# Patient Record
Sex: MALE | Race: WHITE | Employment: FULL TIME | URBAN - METROPOLITAN AREA
[De-identification: names, ages, dates, MRNs, and addresses within clinical notes are randomized per-mention and may not be internally consistent; named-entity substitution may affect disease eponyms.]

---

## 2017-05-06 ENCOUNTER — EMPLOYEE WELLNESS (OUTPATIENT)
Dept: OTHER | Age: 42
End: 2017-05-06

## 2017-05-06 LAB
CHOLESTEROL, TOTAL: 187 MG/DL (ref 0–199)
GLUCOSE BLD-MCNC: 87 MG/DL (ref 74–107)
HDLC SERPL-MCNC: 60 MG/DL
LDL CHOLESTEROL CALCULATED: 112 MG/DL (ref 0–99)
TRIGL SERPL-MCNC: 76 MG/DL (ref 0–149)

## 2018-03-20 VITALS — WEIGHT: 194 LBS

## 2018-04-21 ENCOUNTER — EMPLOYEE WELLNESS (OUTPATIENT)
Dept: INTERNAL MEDICINE CLINIC | Age: 43
End: 2018-04-21

## 2018-04-21 LAB
CHOLESTEROL, TOTAL: 201 MG/DL (ref 0–199)
GLUCOSE BLD-MCNC: 91 MG/DL (ref 74–107)
HDLC SERPL-MCNC: 59 MG/DL
LDL CHOLESTEROL CALCULATED: 126 MG/DL (ref 0–99)
TRIGL SERPL-MCNC: 82 MG/DL (ref 0–149)

## 2018-05-02 VITALS — WEIGHT: 196 LBS

## 2019-02-20 ENCOUNTER — PREP FOR PROCEDURE (OUTPATIENT)
Dept: SURGERY | Age: 44
End: 2019-02-20

## 2019-02-21 RX ORDER — RABEPRAZOLE SODIUM 20 MG/1
TABLET, DELAYED RELEASE ORAL
Refills: 12 | COMMUNITY
Start: 2019-02-04 | End: 2020-12-01

## 2019-02-25 RX ORDER — SODIUM CHLORIDE 9 MG/ML
INJECTION, SOLUTION INTRAVENOUS CONTINUOUS
Status: CANCELLED | OUTPATIENT
Start: 2019-02-25

## 2019-03-01 ENCOUNTER — HOSPITAL ENCOUNTER (OUTPATIENT)
Age: 44
Setting detail: OUTPATIENT SURGERY
Discharge: HOME | End: 2019-03-01
Attending: SURGERY | Admitting: SURGERY
Payer: COMMERCIAL

## 2019-03-01 ENCOUNTER — ANESTHESIA (OUTPATIENT)
Dept: ENDOSCOPY | Age: 44
End: 2019-03-01
Payer: COMMERCIAL

## 2019-03-01 ENCOUNTER — ANESTHESIA EVENT (OUTPATIENT)
Dept: ENDOSCOPY | Age: 44
End: 2019-03-01
Payer: COMMERCIAL

## 2019-03-01 VITALS
HEART RATE: 61 BPM | HEIGHT: 71 IN | DIASTOLIC BLOOD PRESSURE: 93 MMHG | BODY MASS INDEX: 26.6 KG/M2 | RESPIRATION RATE: 16 BRPM | OXYGEN SATURATION: 98 % | TEMPERATURE: 97.5 F | SYSTOLIC BLOOD PRESSURE: 134 MMHG | WEIGHT: 190 LBS

## 2019-03-01 VITALS
SYSTOLIC BLOOD PRESSURE: 135 MMHG | DIASTOLIC BLOOD PRESSURE: 77 MMHG | OXYGEN SATURATION: 97 % | RESPIRATION RATE: 15 BRPM

## 2019-03-01 PROCEDURE — 6360000002 HC RX W HCPCS: Performed by: NURSE ANESTHETIST, CERTIFIED REGISTERED

## 2019-03-01 PROCEDURE — 3609012400 HC EGD TRANSORAL BIOPSY SINGLE/MULTIPLE: Performed by: SURGERY

## 2019-03-01 PROCEDURE — 2580000003 HC RX 258: Performed by: SURGERY

## 2019-03-01 PROCEDURE — 7100000010 HC PHASE II RECOVERY - FIRST 15 MIN: Performed by: SURGERY

## 2019-03-01 PROCEDURE — 88305 TISSUE EXAM BY PATHOLOGIST: CPT

## 2019-03-01 PROCEDURE — 7100000011 HC PHASE II RECOVERY - ADDTL 15 MIN: Performed by: SURGERY

## 2019-03-01 PROCEDURE — 3700000000 HC ANESTHESIA ATTENDED CARE: Performed by: SURGERY

## 2019-03-01 PROCEDURE — 2709999900 HC NON-CHARGEABLE SUPPLY: Performed by: SURGERY

## 2019-03-01 PROCEDURE — 88342 IMHCHEM/IMCYTCHM 1ST ANTB: CPT

## 2019-03-01 RX ORDER — PROPOFOL 10 MG/ML
INJECTION, EMULSION INTRAVENOUS PRN
Status: DISCONTINUED | OUTPATIENT
Start: 2019-03-01 | End: 2019-03-01 | Stop reason: SDUPTHER

## 2019-03-01 RX ORDER — SODIUM CHLORIDE 9 MG/ML
INJECTION, SOLUTION INTRAVENOUS CONTINUOUS
Status: DISCONTINUED | OUTPATIENT
Start: 2019-03-01 | End: 2019-03-01 | Stop reason: HOSPADM

## 2019-03-01 RX ADMIN — PROPOFOL 10 MG: 10 INJECTION, EMULSION INTRAVENOUS at 11:04

## 2019-03-01 RX ADMIN — SODIUM CHLORIDE: 9 INJECTION, SOLUTION INTRAVENOUS at 10:55

## 2019-03-01 RX ADMIN — PROPOFOL 200 MG: 10 INJECTION, EMULSION INTRAVENOUS at 10:56

## 2019-03-01 ASSESSMENT — PAIN - FUNCTIONAL ASSESSMENT: PAIN_FUNCTIONAL_ASSESSMENT: 0-10

## 2019-03-01 ASSESSMENT — PAIN DESCRIPTION - PAIN TYPE
TYPE: SURGICAL PAIN
TYPE: SURGICAL PAIN

## 2019-03-01 ASSESSMENT — PAIN SCALES - GENERAL
PAINLEVEL_OUTOF10: 0
PAINLEVEL_OUTOF10: 0

## 2019-12-28 ENCOUNTER — NURSE TRIAGE (OUTPATIENT)
Dept: OTHER | Facility: CLINIC | Age: 44
End: 2019-12-28

## 2020-08-15 ENCOUNTER — EMPLOYEE WELLNESS (OUTPATIENT)
Dept: OTHER | Age: 45
End: 2020-08-15

## 2020-08-15 LAB
CHOLESTEROL, TOTAL: 186 MG/DL (ref 0–199)
GLUCOSE BLD-MCNC: 96 MG/DL (ref 74–107)
HDLC SERPL-MCNC: 63 MG/DL
LDL CHOLESTEROL CALCULATED: 104 MG/DL (ref 0–99)
TRIGL SERPL-MCNC: 94 MG/DL (ref 0–149)

## 2020-09-14 ENCOUNTER — TELEPHONE (OUTPATIENT)
Dept: PRIMARY CARE CLINIC | Age: 45
End: 2020-09-14

## 2020-09-14 ENCOUNTER — TELEPHONE (OUTPATIENT)
Dept: ADMINISTRATIVE | Age: 45
End: 2020-09-14

## 2020-09-14 NOTE — TELEPHONE ENCOUNTER
Pt's wife called to schedule new appt w/Sandra. Pt is having acid reflux/stomach issues.   She can be reached at [phone]

## 2020-09-14 NOTE — TELEPHONE ENCOUNTER
Patient's wife is going to call the office back to schedule an appointment.     Electronically signed by Mulugeta Hansen on 9/14/20 at 11:16 AM EDT

## 2020-09-15 ENCOUNTER — TELEPHONE (OUTPATIENT)
Dept: SURGERY | Age: 45
End: 2020-09-15

## 2020-09-15 NOTE — TELEPHONE ENCOUNTER
Patient stated he was going to call Dr. Mir Jacob to get an appointment scheduled to be evaluated before his referral to Dr. Bandar Peralta.     Electronically signed by Dorothea Nichole on 9/15/20 at 12:49 PM EDT

## 2020-09-17 ENCOUNTER — HOSPITAL ENCOUNTER (OUTPATIENT)
Age: 45
Discharge: HOME | End: 2020-09-19
Payer: COMMERCIAL

## 2020-09-17 ENCOUNTER — OFFICE VISIT (OUTPATIENT)
Dept: PRIMARY CARE CLINIC | Age: 45
End: 2020-09-17
Payer: COMMERCIAL

## 2020-09-17 VITALS
DIASTOLIC BLOOD PRESSURE: 84 MMHG | BODY MASS INDEX: 27.89 KG/M2 | WEIGHT: 200 LBS | SYSTOLIC BLOOD PRESSURE: 120 MMHG | HEART RATE: 78 BPM | TEMPERATURE: 96.7 F | OXYGEN SATURATION: 97 %

## 2020-09-17 LAB
ALBUMIN SERPL-MCNC: 4.6 G/DL (ref 3.5–5.2)
ALP BLD-CCNC: 61 U/L (ref 40–129)
ALT SERPL-CCNC: 28 U/L (ref 0–40)
AMYLASE: 38 U/L (ref 20–100)
ANION GAP SERPL CALCULATED.3IONS-SCNC: 15 MMOL/L (ref 7–16)
AST SERPL-CCNC: 24 U/L (ref 0–39)
BASOPHILS ABSOLUTE: 0.05 E9/L (ref 0–0.2)
BASOPHILS RELATIVE PERCENT: 1 % (ref 0–2)
BILIRUB SERPL-MCNC: 1.9 MG/DL (ref 0–1.2)
BUN BLDV-MCNC: 20 MG/DL (ref 6–20)
CALCIUM SERPL-MCNC: 10 MG/DL (ref 8.6–10.2)
CHLORIDE BLD-SCNC: 101 MMOL/L (ref 98–107)
CHOLESTEROL, TOTAL: 197 MG/DL (ref 0–199)
CO2: 24 MMOL/L (ref 22–29)
CREAT SERPL-MCNC: 1 MG/DL (ref 0.7–1.2)
EOSINOPHILS ABSOLUTE: 0.4 E9/L (ref 0.05–0.5)
EOSINOPHILS RELATIVE PERCENT: 7.8 % (ref 0–6)
GFR AFRICAN AMERICAN: >60
GFR NON-AFRICAN AMERICAN: >60 ML/MIN/1.73
GLUCOSE BLD-MCNC: 89 MG/DL (ref 74–99)
HCT VFR BLD CALC: 48.1 % (ref 37–54)
HDLC SERPL-MCNC: 56 MG/DL
HEMOGLOBIN: 16.2 G/DL (ref 12.5–16.5)
IMMATURE GRANULOCYTES #: 0 E9/L
IMMATURE GRANULOCYTES %: 0 % (ref 0–5)
LDL CHOLESTEROL CALCULATED: 120 MG/DL (ref 0–99)
LIPASE: 19 U/L (ref 13–60)
LYMPHOCYTES ABSOLUTE: 1.34 E9/L (ref 1.5–4)
LYMPHOCYTES RELATIVE PERCENT: 26 % (ref 20–42)
MCH RBC QN AUTO: 30.7 PG (ref 26–35)
MCHC RBC AUTO-ENTMCNC: 33.7 % (ref 32–34.5)
MCV RBC AUTO: 91.3 FL (ref 80–99.9)
MONOCYTES ABSOLUTE: 0.53 E9/L (ref 0.1–0.95)
MONOCYTES RELATIVE PERCENT: 10.3 % (ref 2–12)
NEUTROPHILS ABSOLUTE: 2.83 E9/L (ref 1.8–7.3)
NEUTROPHILS RELATIVE PERCENT: 54.9 % (ref 43–80)
PDW BLD-RTO: 12.5 FL (ref 11.5–15)
PLATELET # BLD: 199 E9/L (ref 130–450)
PMV BLD AUTO: 10 FL (ref 7–12)
POTASSIUM SERPL-SCNC: 4.4 MMOL/L (ref 3.5–5)
RBC # BLD: 5.27 E12/L (ref 3.8–5.8)
SODIUM BLD-SCNC: 140 MMOL/L (ref 132–146)
TOTAL PROTEIN: 7.3 G/DL (ref 6.4–8.3)
TRIGL SERPL-MCNC: 103 MG/DL (ref 0–149)
TSH SERPL DL<=0.05 MIU/L-ACNC: 1.91 UIU/ML (ref 0.27–4.2)
VLDLC SERPL CALC-MCNC: 21 MG/DL
WBC # BLD: 5.2 E9/L (ref 4.5–11.5)

## 2020-09-17 PROCEDURE — 99214 OFFICE O/P EST MOD 30 MIN: CPT | Performed by: FAMILY MEDICINE

## 2020-09-17 PROCEDURE — 84443 ASSAY THYROID STIM HORMONE: CPT

## 2020-09-17 PROCEDURE — 80053 COMPREHEN METABOLIC PANEL: CPT

## 2020-09-17 PROCEDURE — 83690 ASSAY OF LIPASE: CPT

## 2020-09-17 PROCEDURE — 80061 LIPID PANEL: CPT

## 2020-09-17 PROCEDURE — 85025 COMPLETE CBC W/AUTO DIFF WBC: CPT

## 2020-09-17 PROCEDURE — 82150 ASSAY OF AMYLASE: CPT

## 2020-09-17 PROCEDURE — 36415 COLL VENOUS BLD VENIPUNCTURE: CPT

## 2020-09-17 RX ORDER — FAMOTIDINE 40 MG/1
40 TABLET, FILM COATED ORAL EVERY EVENING
Qty: 30 TABLET | Refills: 3 | Status: SHIPPED | OUTPATIENT
Start: 2020-09-17 | End: 2020-12-01

## 2020-09-17 ASSESSMENT — PATIENT HEALTH QUESTIONNAIRE - PHQ9
SUM OF ALL RESPONSES TO PHQ9 QUESTIONS 1 & 2: 0
SUM OF ALL RESPONSES TO PHQ QUESTIONS 1-9: 0
1. LITTLE INTEREST OR PLEASURE IN DOING THINGS: 0
SUM OF ALL RESPONSES TO PHQ QUESTIONS 1-9: 0
2. FEELING DOWN, DEPRESSED OR HOPELESS: 0

## 2020-09-17 ASSESSMENT — ENCOUNTER SYMPTOMS
EYES NEGATIVE: 1
RESPIRATORY NEGATIVE: 1
ALLERGIC/IMMUNOLOGIC NEGATIVE: 1
DIARRHEA: 1
ABDOMINAL DISTENTION: 1

## 2020-09-17 NOTE — PROGRESS NOTES
20     Ian Elkins    : 1975 Sex: male   Age: 41 y.o. Chief Complaint   Patient presents with    Abdominal Pain     worsening the last few months with cramping. Bloated alot       Prior to Admission medications    Medication Sig Start Date End Date Taking? Authorizing Provider   famotidine (PEPCID) 40 MG tablet Take 1 tablet by mouth every evening 20  Yes Bladimir Zhong,    RABEprazole (ACIPHEX) 20 MG tablet TAKE ONE TABLET BY MOUTH EVERY DAY 19  Yes Historical Provider, MD          HPI: Patient seen this morning issues of diarrhea abdominal bloating reflux disease hyperlipidemia all of which is been present for the past 6 to 9 months by history. He was seen approximately a year ago for reflux disease and placed on AcipHex and this could be a contributing factor. AcipHex is placed on hold and we will try Pepcid 40 mg a day and then reassess here in the next couple weeks. Baseline labs to be completed. Review of Systems   Constitutional: Negative. HENT: Negative. Eyes: Negative. Respiratory: Negative. Gastrointestinal: Positive for abdominal distention and diarrhea. Endocrine: Negative. Genitourinary: Negative. Musculoskeletal: Negative. Skin: Negative. Allergic/Immunologic: Negative. Neurological: Negative. Hematological: Negative. Psychiatric/Behavioral: Negative.                Current Outpatient Medications:     famotidine (PEPCID) 40 MG tablet, Take 1 tablet by mouth every evening, Disp: 30 tablet, Rfl: 3    RABEprazole (ACIPHEX) 20 MG tablet, TAKE ONE TABLET BY MOUTH EVERY DAY, Disp: , Rfl: 12    No Known Allergies    Social History     Tobacco Use    Smoking status: Never Smoker    Smokeless tobacco: Never Used   Substance Use Topics    Alcohol use: Yes     Comment: socially    Drug use: No      Past Surgical History:   Procedure Laterality Date    SHOULDER SURGERY Right 2018    bone spurs    UPPER GASTROINTESTINAL Future  -     Lipase; Future    Abdominal bloating  -     CBC Auto Differential; Future  -     Comprehensive Metabolic Panel; Future  -     Cancel: T4; Future  -     TSH without Reflex; Future  -     Lipid Panel; Future  -     Amylase; Future  -     Lipase; Future    Gastroesophageal reflux disease with esophagitis  -     CBC Auto Differential; Future  -     Comprehensive Metabolic Panel; Future  -     Cancel: T4; Future  -     TSH without Reflex; Future  -     Lipid Panel; Future    Hyperlipidemia, unspecified hyperlipidemia type  -     CBC Auto Differential; Future  -     Comprehensive Metabolic Panel; Future  -     Cancel: T4; Future  -     TSH without Reflex; Future  -     Lipid Panel; Future    Other orders  -     famotidine (PEPCID) 40 MG tablet; Take 1 tablet by mouth every evening            Return in about 2 weeks (around 10/1/2020). Jomar Fowler DO    Note was generated with the assistance of voice recognition software. Document was reviewed however may contain grammatical errors.

## 2020-10-01 ENCOUNTER — OFFICE VISIT (OUTPATIENT)
Dept: PRIMARY CARE CLINIC | Age: 45
End: 2020-10-01
Payer: COMMERCIAL

## 2020-10-01 VITALS
BODY MASS INDEX: 27.34 KG/M2 | OXYGEN SATURATION: 98 % | RESPIRATION RATE: 16 BRPM | WEIGHT: 196 LBS | HEART RATE: 68 BPM | SYSTOLIC BLOOD PRESSURE: 110 MMHG | DIASTOLIC BLOOD PRESSURE: 70 MMHG | TEMPERATURE: 97.7 F

## 2020-10-01 PROBLEM — K58.0 IRRITABLE BOWEL SYNDROME WITH DIARRHEA: Status: ACTIVE | Noted: 2020-10-01

## 2020-10-01 PROBLEM — R10.84 GENERALIZED ABDOMINAL PAIN: Status: ACTIVE | Noted: 2020-10-01

## 2020-10-01 PROBLEM — K21.9 GASTROESOPHAGEAL REFLUX DISEASE: Status: ACTIVE | Noted: 2020-10-01

## 2020-10-01 PROCEDURE — 99214 OFFICE O/P EST MOD 30 MIN: CPT | Performed by: FAMILY MEDICINE

## 2020-10-01 ASSESSMENT — ENCOUNTER SYMPTOMS
RESPIRATORY NEGATIVE: 1
ABDOMINAL PAIN: 1
ALLERGIC/IMMUNOLOGIC NEGATIVE: 1
ABDOMINAL DISTENTION: 1
EYES NEGATIVE: 1
DIARRHEA: 1

## 2020-10-01 NOTE — PROGRESS NOTES
10/1/20     Annalise Parson    : 1975 Sex: male   Age: 41 y.o. Chief Complaint   Patient presents with    Diarrhea    Abdominal Pain    Gastroesophageal Reflux    Hyperlipidemia    Discuss Labs       Prior to Admission medications    Medication Sig Start Date End Date Taking? Authorizing Provider   famotidine (PEPCID) 40 MG tablet Take 1 tablet by mouth every evening 20  Yes Bladimir Zhong,    RABEprazole (ACIPHEX) 20 MG tablet TAKE ONE TABLET BY MOUTH EVERY DAY 19   Historical Provider, MD          HPI: Patient evaluated today with irritable bowel diarrhea intermittent abdominal discomfort bloating gastroesophageal reflux disease no significant change with switching from AcipHex to famotidine but we will maintain the famotidine for the time being. Recommending evaluation for upper and lower endoscopy and will schedule with Dr. Christophe Cedeno. Review of Systems   Constitutional: Negative. HENT: Negative. Eyes: Negative. Respiratory: Negative. Gastrointestinal: Positive for abdominal distention, abdominal pain and diarrhea. Endocrine: Negative. Genitourinary: Negative. Musculoskeletal: Negative. Skin: Negative. Allergic/Immunologic: Negative. Neurological: Negative. Hematological: Negative. Psychiatric/Behavioral: Negative.                Current Outpatient Medications:     famotidine (PEPCID) 40 MG tablet, Take 1 tablet by mouth every evening, Disp: 30 tablet, Rfl: 3    RABEprazole (ACIPHEX) 20 MG tablet, TAKE ONE TABLET BY MOUTH EVERY DAY, Disp: , Rfl: 12    No Known Allergies    Social History     Tobacco Use    Smoking status: Never Smoker    Smokeless tobacco: Never Used   Substance Use Topics    Alcohol use: Yes     Comment: socially    Drug use: No      Past Surgical History:   Procedure Laterality Date    SHOULDER SURGERY Right 2018    bone spurs    UPPER GASTROINTESTINAL ENDOSCOPY N/A 3/1/2019    EGD BIOPSY performed by Sam Courtney Cheryle Brave, MD at [facility] ENDOSCOPY     No family history on file. Past Medical History:   Diagnosis Date    Acid reflux disease        Vitals:    10/01/20 0714   BP: 110/70   Pulse: 68   Resp: 16   Temp: 97.7 °F (36.5 °C)   TempSrc: Temporal   SpO2: 98%   Weight: 196 lb (88.9 kg)     BP Readings from Last 3 Encounters:   10/01/20 110/70   09/17/20 120/84   03/01/19 (!) 134/93        Physical Exam  Vitals signs and nursing note reviewed. Constitutional:       Appearance: He is well-developed. HENT:      Head: Normocephalic. Right Ear: External ear normal.      Left Ear: External ear normal.      Nose: Nose normal.   Eyes:      Conjunctiva/sclera: Conjunctivae normal.      Pupils: Pupils are equal, round, and reactive to light. Neck:      Musculoskeletal: Normal range of motion and neck supple. Cardiovascular:      Rate and Rhythm: Normal rate. Pulmonary:      Breath sounds: Normal breath sounds. Abdominal:      General: Bowel sounds are normal.      Palpations: Abdomen is soft. Musculoskeletal: Normal range of motion. Skin:     General: Skin is warm and dry. Neurological:      Mental Status: He is alert and oriented to person, place, and time. Psychiatric:         Behavior: Behavior normal.     's vitals physical examination stable. Maintain current meds and care. Reassessment with me next month and sooner if problems. Labs reviewed slight bilirubin elevation so we will repeat a CMP prior to follow-up maintain current care surgical referral today. Plan Per Assessment:  Emi Kahn was seen today for diarrhea, abdominal pain, gastroesophageal reflux, hyperlipidemia and discuss labs. Diagnoses and all orders for this visit:    Irritable bowel syndrome with diarrhea    Generalized abdominal pain    Gastroesophageal reflux disease, unspecified whether esophagitis present            No follow-ups on file.       Shona Foster DO    Note was generated with the assistance of voice

## 2020-10-15 ENCOUNTER — OFFICE VISIT (OUTPATIENT)
Dept: SURGERY | Age: 45
End: 2020-10-15
Payer: COMMERCIAL

## 2020-10-15 VITALS
HEART RATE: 73 BPM | DIASTOLIC BLOOD PRESSURE: 80 MMHG | SYSTOLIC BLOOD PRESSURE: 140 MMHG | RESPIRATION RATE: 16 BRPM | WEIGHT: 203.5 LBS | BODY MASS INDEX: 28.49 KG/M2 | OXYGEN SATURATION: 99 % | HEIGHT: 71 IN | TEMPERATURE: 98.5 F

## 2020-10-15 PROCEDURE — 99203 OFFICE O/P NEW LOW 30 MIN: CPT | Performed by: SURGERY

## 2020-10-15 RX ORDER — DICYCLOMINE HCL 20 MG
20 TABLET ORAL 3 TIMES DAILY PRN
Qty: 120 TABLET | Refills: 3 | Status: SHIPPED | OUTPATIENT
Start: 2020-10-15

## 2020-10-15 NOTE — PATIENT INSTRUCTIONS
Patient Education        Learning About the Low FODMAP Diet for Irritable Bowel Syndrome (IBS)  What is the low-FODMAP diet? A low-FODMAP diet is a way to find out what foods give you digestion problems. You stop eating certain high-FODMAP foods for about 2 months. Then you add them back to see how your body reacts. This is called a \"challenge diet. \" A dietitian or doctor can help you follow this diet. FODMAPs are carbohydrates. They are in many types of foods. FODMAP stands for:  · F ermentable. · O ligosaccharides. · D isaccharides. · M onosaccharides. · A nd p olyols. If you have digestive problems, some of these foods can make your symptoms worse. When you are on this diet, you can still eat certain fruits and vegetables. You can also eat certain grains, meats, fish, and lactose-free milks. What is it used for? If you have irritable bowel syndrome (IBS), you can ease your symptoms by not eating some types of foods. Some people also use this diet for inflammatory bowel disease (IBD) or some food intolerances. High-FODMAP foods can be hard to digest. They pull more fluid into your intestines. They are also easily fermented. This can lead to bloating, belly pain, gas, and diarrhea. The low-FODMAP diet can help you figure out what foods to avoid. And it can help you find foods that are easier to digest.  This diet can help with symptoms of some digestive diseases. But it's not a cure. You will still need to manage your condition. How does it work? You will work with a doctor or dietitian when you start the diet. At first, you won't eat any high-FODMAP foods for a few weeks. Go to www.[employer]. Tune[employer]rol to learn more about this diet. Eneida Reyes also find links to an radha for your phone or other device. You'll find low-FODMAP cookbooks there too. After 6 to 8 weeks, you will start to try high-FODMAP foods again. You will add those foods back to your diet, one group at a time.  Your doctor or dietitian will probably have you wait a few days before you add each new group of those foods. Keep a food diary. You can write down the foods you try and note how they make you feel. After a few weeks, you may have a better idea of what foods you should avoid and what foods make you feel your best.  What are the risks? There is some risk of not getting all of the vitamins and nutrients you need on the low-FODMAP diet. These include:  · Folate. · Thiamin. · Vitamin B6.  · Calcium. · Vitamin D. Your dietitian or doctor can help you find other sources of these if needed. This diet may limit your fiber intake. Try to plan your meals to include other sources of fiber. What foods are on the low-FODMAP diet? Here is a guide to foods that you can eat, plus the foods that you should avoid, when you are on the low-FODMAP diet. Grains  Okay to eat: Foods made from grains like arrowroot, buckwheat, corn, millet, and oats. You can also eat potato, quinoa, rice, sorghum, tapioca, and teff. Cereals, pasta, breads, corn tortillas and baked goods made from these grains are also okay. (These grains may be labeled \"gluten-free. \")  Avoid: Grains like wheat, barley, and rye. Avoid ingredients such as bulgur, couscous, durum, and semolina. And avoid cereals, breads, and pastas made from these grains. Avoid chickpea, lentil, and pea flour. Proteins  Okay to eat: Most meat, fish, and eggs without high-FODMAP sauces. You can have small amounts of almonds or hazelnuts (10 nuts). Macadamia nuts, peanuts, pecans, pine nuts, and walnuts are also okay. You can also eat cesar and pumpkin seeds, tofu, and tempeh. Avoid: Beans, chickpeas, lentils, and soybeans. Avoid pistachio and cashew nuts. And some sausages may have high-FODMAP ingredients. Dairy  Okay to eat: Lactose-free dairy milks. Rice milk and almond milk are okay. So are lactose-free yogurts, kefirs, ice creams, and sorbet from low-FODMAP fruits and sweeteners.  (These are often labeled \"lactose-free. \") You can have small amounts (2 Tbsp) of cottage, cream, or ricotta cheese. Hard cheeses like cheddar, Aguila, ROSS, and Swiss are okay. So are small amounts (1 oz) of aged or ripened cheeses like Brie, blue, and feta. Avoid: Milk, including cow, goat, and sheep. Avoid condensed or evaporated milk, buttermilk, custard, cream, sour cream, yogurt, and ice cream. Avoid soy milk. (Check sauces for dairy ingredients.)  Vegetables  Okay to eat: Bamboo shoots, bell peppers, bok beatriz, up to ½ cup of broccoli or cabbage (red or white), and cucumbers. Eggplant, green beans, lettuce, olives, parsnips, and potatoes are okay to eat. So are pumpkin, rutabaga, seaweed, sprouts, Swiss chard, and spinach. You can eat scallions (green part only) and squash (not butternut). You can eat tomatoes, turnips, watercress, yams, and zucchini. You can also have small amounts of artichoke hearts (from can, 1 oz), carrots, corn (½ cob), and sweet potato (½ cup). Avoid: Artichokes, asparagus, [place] sprouts, edward cabbage, cauliflower, and celery. And avoid garlic, leeks, mushrooms, okra, onions, scallions (white part), shallots, and peas. Fruits  Okay to eat: Bananas, blueberries, cantaloupe, coconut, grapes, and honeydew. Kiwi, quinten, limes, oranges, passion fruit, papaya, and pineapple are also okay. You can eat plantain, raspberries, rhubarb, star fruit, strawberries, tangelo, and tangerine. You can also have small amounts of dried banana chips (up to 10 chips), dried cranberries (1 Tbsp), and shredded coconut (up to ¼ cup). Avoid: Apples, applesauce, apricots, avocados, blackberries, boysenberries, and cherries. Also avoid dates, figs, grapefruit, guava, lychee, and mangoes. Don't eat nectarines, peaches, pears, persimmon, plums, prunes, tamarillo, or watermelon. And limit most canned and dried fruits.   Oils, spices, condiments, and sweeteners  Okay to eat: Vegetable oils (including garlic infused), butter, ghee, Version: 12.6  © 2360-9084 [employer]. Care instructions adapted under license by [facility] ([facility]). If you have questions about a medical condition or this instruction, always ask your healthcare professional. [address] any warranty or liability for your use of this information. Patient Education        Irritable Bowel Syndrome: Care Instructions  Your Care Instructions  Irritable bowel syndrome, or IBS, is a problem with the intestines that causes belly pain, bloating, gas, constipation, and diarrhea. The cause of IBS is not well known. IBS can last for many years, but it does not get worse over time or lead to serious disease. Most people can control their symptoms by changing their diet and reducing stress. Follow-up care is a key part of your treatment and safety. Be sure to make and go to all appointments, and call your doctor if you are having problems. It's also a good idea to know your test results and keep a list of the medicines you take. How can you care for yourself at home? · Prevent diarrhea:  ? Limit the amount of high-fiber foods you eat. This includes vegetables, fruits, whole-grain breads and pasta, high-fiber cereal, and brown rice. ? Limit dairy products. ? Limit artificial sweeteners such as sorbitol and xylitol. · Avoid constipation:  ? Include fruits, vegetables, beans, and whole grains in your diet each day. These foods are high in fiber. ? Drink plenty of fluids. If you have kidney, heart, or liver disease and have to limit fluids, talk with your doctor before you increase the amount of fluids you drink. ? Get some exercise every day. Build up slowly to 30 to 60 minutes a day on 5 or more days of the week. ? Take a fiber supplement, such as Citrucel or Metamucil, every day if needed. Read and follow all instructions on the label. ? Schedule time each day for a bowel movement. Having a daily routine may help.  Take your time and do not strain when having a bowel movement. · To help relieve bloating or gas, avoid foods such as beans, cabbage, cauliflower, or broccoli. · Keep a daily diary of what you eat and what symptoms you have. This may help find foods that cause you problems. · Eat slowly. Try to make mealtime relaxing. · Find ways to reduce stress. When should you call for help? Call your doctor now or seek immediate medical care if:    · Your pain is different than usual or occurs with fever.     · You lose weight without trying, or you lose your appetite and you do not know why.     · Your symptoms often wake you from sleep.     · Your stools are black and tarlike or have streaks of blood. Watch closely for changes in your health, and be sure to contact your doctor if:    · Your IBS symptoms get worse or begin to disrupt your day-to-day life.     · You become more tired than usual.     · Your home treatment stops working. Where can you learn more? Go to https://K[employer].Cynny. org and sign in to your [employer] account. Enter X009 in the SurgeryEdu box to learn more about \"Irritable Bowel Syndrome: Care Instructions. \"     If you do not have an account, please click on the \"Sign Up Now\" link. Current as of: April 15, 2020               Content Version: 12.6  © 1156-2174 [employer]. Care instructions adapted under license by [facility] [place] ([facility]). If you have questions about a medical condition or this instruction, always ask your healthcare professional. [place] [address] any warranty or liability for your use of this information. Patient Education        Colonoscopy: What to Expect at [address] [place] [address]  After a colonoscopy, you'll stay at the clinic for 1 to 2 hours until the medicines wear off. Then you can go home. But you'll need to arrange for a ride. Your doctor will tell you when you can eat and do your other usual activities.   Your doctor will talk to you about when you'll need your next colonoscopy. Your doctor can help you decide how often you need to be checked. This will depend on the results of your test and your risk for colorectal cancer. After the test, you may be bloated or have gas pains. You may need to pass gas. If a biopsy was done or a polyp was removed, you may have streaks of blood in your stool (feces) for a few days. Problems such as heavy rectal bleeding may not occur until several weeks after the test. This isn't common. But it can happen after polyps are removed. This care sheet gives you a general idea about how long it will take for you to recover. But each person recovers at a different pace. Follow the steps below to get better as quickly as possible. How can you care for yourself at home? Activity    · Rest when you feel tired.     · You can do your normal activities when it feels okay to do so. Diet    · Follow your doctor's directions for eating.     · Unless your doctor has told you not to, drink plenty of fluids. This helps to replace the fluids that were lost during the colon prep.     · Do not drink alcohol. Medicines    · Your doctor will tell you if and when you can restart your medicines. He or she will also give you instructions about taking any new medicines.     · If you take aspirin or some other blood thinner, ask your doctor if and when to start taking it again. Make sure that you understand exactly what your doctor wants you to do.     · If polyps were removed or a biopsy was done during the test, your doctor may tell you not to take aspirin or other anti-inflammatory medicines for a few days. These include ibuprofen (Advil, Motrin) and naproxen (Aleve). Other instructions    · For your safety, do not drive or operate machinery until the medicine wears off and you can think clearly.  Your doctor may tell you not to drive or operate machinery until the day after your test.     · Do not sign legal documents or make major decisions until the medicine wears off and you can think clearly. The anesthesia can make it hard for you to fully understand what you are agreeing to. Follow-up care is a key part of your treatment and safety. Be sure to make and go to all appointments, and call your doctor if you are having problems. It's also a good idea to know your test results and keep a list of the medicines you take. When should you call for help? Call 911 anytime you think you may need emergency care. For example, call if:    · You passed out (lost consciousness).     · You pass maroon or bloody stools.     · You have trouble breathing. Call your doctor now or seek immediate medical care if:    · You have pain that does not get better after you take pain medicine.     · You are sick to your stomach or cannot drink fluids.     · You have new or worse belly pain.     · You have blood in your stools.     · You have a fever.     · You cannot pass stools or gas. Watch closely for changes in your health, and be sure to contact your doctor if you have any problems. Where can you learn more? Go to https://Fortressware.kidthing. org and sign in to your [employer] account. Enter E264 in the [employer] box to learn more about \"Colonoscopy: What to Expect at Home. \"     If you do not have an account, please click on the \"Sign Up Now\" link. Current as of: April 29, 2020               Content Version: 12.6  © 8321-7912 [employer]. Care instructions adapted under license by [facility] ([facility]). If you have questions about a medical condition or this instruction, always ask your healthcare professional. [place] [address] any warranty or liability for your use of this information. Patient Education        Gastroesophageal Reflux Disease (GERD): Care Instructions  Overview     Gastroesophageal reflux disease (GERD) is the backward flow of stomach acid into the esophagus.  The esophagus is the tube that leads from your throat to your stomach. A one-way valve prevents the stomach acid from backing up into this tube. But when you have GERD, this valve does not close tightly enough. This can also cause pain and swelling in your esophagus. (This is called esophagitis.)  If you have mild GERD symptoms including heartburn, you may be able to control the problem with antacids or over-the-counter medicine. You can also make lifestyle changes to help reduce your symptoms. These include changing your diet and eating habits, such as not eating late at night and losing weight. Follow-up care is a key part of your treatment and safety. Be sure to make and go to all appointments, and call your doctor if you are having problems. It's also a good idea to know your test results and keep a list of the medicines you take. How can you care for yourself at home? · Take your medicines exactly as prescribed. Call your doctor if you think you are having a problem with your medicine. · Your doctor may recommend over-the-counter medicine. For mild or occasional indigestion, antacids, such as Tums, Gaviscon, Mylanta, or Maalox, may help. Your doctor also may recommend over-the-counter acid reducers, such as famotidine (Pepcid AC), cimetidine (Tagamet HB), or omeprazole (Prilosec). Read and follow all instructions on the label. If you use these medicines often, talk with your doctor. · Change your eating habits. ? It's best to eat several small meals instead of two or three large meals. ? After you eat, wait 2 to 3 hours before you lie down. ? Chocolate, mint, and alcohol can make GERD worse. ? Spicy foods, foods that have a lot of acid (like tomatoes and oranges), and coffee can make GERD symptoms worse in some people. If your symptoms are worse after you eat a certain food, you may want to stop eating that food to see if your symptoms get better. · Do not smoke or chew tobacco. Smoking can make GERD worse.  If you need help

## 2020-10-15 NOTE — PROGRESS NOTES
111 [facility] Surgery Clinic Note    Assessment/Plan:      Diagnosis Orders   1. Irritable bowel syndrome with diarrhea  dicyclomine (BENTYL) 20 MG tablet      Bentyl. Continue Pepcid. Diet modification with low FODMAP. Plan for colonoscopy. Has had recent EGD         Return for Colonoscopy. Chief Complaint   Patient presents with    New Patient     ref by Dr. David Garcia for IBS and diarrhea. GERD. has never had a colonoscopy before. has had an EGD before. PCP: Minesh Forrest DO    HPI: Almarie Bence is a 41 y.o. male who presents in consultation for GERD and irritable bowel symptoms. His GERD is worse at night. He does take Pepcid nightly. He was on AcipHex but that made his stools much looser and he was changed off without the Pepcid. There is no nausea or vomiting. He denies any tobacco use. He occasionally drinks alcohol does not cause exacerbation of his symptoms. He has a cup or 2 of caffeine in the morning. He also endorses loose stools for years. He is never had prior colonoscopy. He did have an EGD last year Dr. wGen Morales which showed a small hiatal hernia and gastritis. H. pylori was negative. He denies any melena or hematochezia. He does have cramping and bloating most on the left side. There is no change in his symptoms with bowel movement either improvement or worsening. No change with BMs he does not do much fiber in his diet. His wife is a cardiac rehab exercise physiologist     Past Medical History:   Diagnosis Date    Acid reflux disease        Past Surgical History:   Procedure Laterality Date    SHOULDER SURGERY Right 2018    bone spurs    UPPER GASTROINTESTINAL ENDOSCOPY N/A 3/1/2019    EGD BIOPSY performed by Clifford Richard MD at [address] [place] [address]       Prior to Admission medications    Medication Sig Start Date End Date Taking?  Authorizing Provider   dicyclomine (BENTYL) 20 MG tablet Take 1 tablet by mouth 3 times daily as needed (cramping) 10/15/20  Yes Magno Harding MD   famotidine (PEPCID) 40 MG tablet Take 1 tablet by mouth every evening 9/17/20  Yes Felecia Zhong DO   RABEprazole (ACIPHEX) 20 MG tablet TAKE ONE TABLET BY MOUTH EVERY DAY 2/4/19   Historical Provider, MD       No Known Allergies    Social History     Socioeconomic History    Marital status:      Spouse name: None    Number of children: None    Years of education: None    Highest education level: None   Occupational History    None   Social Needs    Financial resource strain: None    Food insecurity     Worry: None     Inability: None    Transportation needs     Medical: None     Non-medical: None   Tobacco Use    Smoking status: Never Smoker    Smokeless tobacco: Never Used   Substance and Sexual Activity    Alcohol use: Yes     Comment: socially    Drug use: No    Sexual activity: None   Lifestyle    Physical activity     Days per week: None     Minutes per session: None    Stress: None   Relationships    Social connections     Talks on phone: None     Gets together: None     Attends Tenriism service: None     Active member of club or organization: None     Attends meetings of clubs or organizations: None     Relationship status: None    Intimate partner violence     Fear of current or ex partner: None     Emotionally abused: None     Physically abused: None     Forced sexual activity: None   Other Topics Concern    None   Social History Narrative    None       History reviewed. No pertinent family history. Review of Systems   All other systems reviewed and are negative. Objective:  Vitals:    10/15/20 1545   BP: (!) 140/80   Site: Right Upper Arm   Position: Sitting   Cuff Size: Medium Adult   Pulse: 73   Resp: 16   Temp: 98.5 °F (36.9 °C)   TempSrc: Temporal   SpO2: 99%   Weight: 203 lb 8 oz (92.3 kg)   Height: 5' 11\" (1.803 m)          Physical Exam  Constitutional:       General: He is not in acute distress.      Appearance: He is not diaphoretic. HENT:      Head: Normocephalic and atraumatic. Eyes:      General:         Right eye: No discharge. Left eye: No discharge. Neck:      Trachea: No tracheal deviation. Cardiovascular:      Rate and Rhythm: Normal rate. Pulmonary:      Effort: Pulmonary effort is normal. No respiratory distress. Abdominal:      General: There is no distension. Palpations: Abdomen is soft. Tenderness: There is no abdominal tenderness. There is no guarding or rebound. Skin:     General: Skin is warm and dry. Neurological:      Mental Status: He is alert and oriented to person, place, and time. Willie Apley, MD  10/16/2020    NOTE: This report, in part or full,may have been transcribed using voice recognition software. Every effort was made to ensure accuracy; however, inadvertent computerized transcription errors may be present. Please excuse any transcriptional grammatical or spelling errors that may have escaped my editorial review.     CC: Neptali Zhong, DO

## 2020-10-19 VITALS — BODY MASS INDEX: 27.2 KG/M2 | WEIGHT: 195 LBS

## 2020-10-21 ENCOUNTER — TELEPHONE (OUTPATIENT)
Dept: SURGERY | Age: 45
End: 2020-10-21

## 2020-10-21 NOTE — TELEPHONE ENCOUNTER
Annalise Parson is scheduled for colonoscopy with Dr Christophe Cedeno on 11-13-20 at [facility] at 2:30 pm. Patient was told to arrive at 1:30 pm. Patient needs to be NPO after midnight the night before procedure. All surgery instructions were explained to the patient and a surgery letter was also mailed out. MA informed patient that PAT will also be calling to review pre-op instructions and medications. Patient verbalized understanding.   Electronically signed by Reymundo Ambriz MA on 10/21/2020 at 1:02 PM

## 2020-10-21 NOTE — TELEPHONE ENCOUNTER
Prior Authorization Form:      DEMOGRAPHICS:                     Patient Name:  Natasha Mari  Patient :  1975            Insurance:  Payor: Tonya Faulknerjl [address] / Plan: LudmilaTushar [address] [employer] - [place] PPO / Product Type: *No Product type* /   Insurance ID Number:    Payor/Plan Subscr  Sex Relation Sub. Ins. ID Effective Group Num   1. [address] -* FAWN GONZALEZ 1975 Male  [id number]                                   [address]   2.  [address]* 1976 Female  [id number] 17 [id number]                                   [address]         DIAGNOSIS & PROCEDURE:                       Procedure/Operation: Colonoscopy           CPT Code: 12177    Diagnosis:  Irritable bowel syndrome     ICD10 Code: K58.0    Location:  [facility]    Surgeon:  Dr Becky Carter INFORMATION:                          Date: 20    Time: 1:30 pm              Anesthesia:  [facility]                                                       Status:  Outpatient        Special Comments:         Electronically signed by Niyah Fernandez MA on 10/21/2020 at 1:05 PM

## 2020-11-09 ENCOUNTER — HOSPITAL ENCOUNTER (OUTPATIENT)
Age: 45
Discharge: HOME | End: 2020-11-11
Payer: COMMERCIAL

## 2020-11-12 ENCOUNTER — ANESTHESIA EVENT (OUTPATIENT)
Dept: ENDOSCOPY | Age: 45
End: 2020-11-12
Payer: COMMERCIAL

## 2020-11-12 RX ORDER — CALCIUM CARBONATE 200(500)MG
1 TABLET,CHEWABLE ORAL DAILY PRN
COMMUNITY

## 2020-11-13 ENCOUNTER — HOSPITAL ENCOUNTER (OUTPATIENT)
Age: 45
Setting detail: OUTPATIENT SURGERY
Discharge: HOME | End: 2020-11-13
Attending: SURGERY | Admitting: SURGERY
Payer: COMMERCIAL

## 2020-11-13 ENCOUNTER — ANESTHESIA (OUTPATIENT)
Dept: ENDOSCOPY | Age: 45
End: 2020-11-13
Payer: COMMERCIAL

## 2020-11-13 VITALS
BODY MASS INDEX: 27.58 KG/M2 | WEIGHT: 197 LBS | HEIGHT: 71 IN | TEMPERATURE: 97.6 F | SYSTOLIC BLOOD PRESSURE: 141 MMHG | RESPIRATION RATE: 20 BRPM | HEART RATE: 88 BPM | DIASTOLIC BLOOD PRESSURE: 91 MMHG | OXYGEN SATURATION: 97 %

## 2020-11-13 VITALS
OXYGEN SATURATION: 98 % | SYSTOLIC BLOOD PRESSURE: 140 MMHG | DIASTOLIC BLOOD PRESSURE: 100 MMHG | RESPIRATION RATE: 17 BRPM

## 2020-11-13 PROCEDURE — 3609010300 HC COLONOSCOPY W/BIOPSY SINGLE/MULTIPLE: Performed by: SURGERY

## 2020-11-13 PROCEDURE — 7100000011 HC PHASE II RECOVERY - ADDTL 15 MIN: Performed by: SURGERY

## 2020-11-13 PROCEDURE — 3700000000 HC ANESTHESIA ATTENDED CARE: Performed by: SURGERY

## 2020-11-13 PROCEDURE — 7100000010 HC PHASE II RECOVERY - FIRST 15 MIN: Performed by: SURGERY

## 2020-11-13 PROCEDURE — 2709999900 HC NON-CHARGEABLE SUPPLY: Performed by: SURGERY

## 2020-11-13 PROCEDURE — 88305 TISSUE EXAM BY PATHOLOGIST: CPT

## 2020-11-13 PROCEDURE — 45380 COLONOSCOPY AND BIOPSY: CPT | Performed by: SURGERY

## 2020-11-13 PROCEDURE — 2580000003 HC RX 258: Performed by: NURSE ANESTHETIST, CERTIFIED REGISTERED

## 2020-11-13 PROCEDURE — 3700000001 HC ADD 15 MINUTES (ANESTHESIA): Performed by: SURGERY

## 2020-11-13 PROCEDURE — 6360000002 HC RX W HCPCS: Performed by: NURSE ANESTHETIST, CERTIFIED REGISTERED

## 2020-11-13 PROCEDURE — 2500000003 HC RX 250 WO HCPCS: Performed by: NURSE ANESTHETIST, CERTIFIED REGISTERED

## 2020-11-13 RX ORDER — PROPOFOL 10 MG/ML
INJECTION, EMULSION INTRAVENOUS PRN
Status: DISCONTINUED | OUTPATIENT
Start: 2020-11-13 | End: 2020-11-13 | Stop reason: SDUPTHER

## 2020-11-13 RX ORDER — LIDOCAINE HYDROCHLORIDE 20 MG/ML
INJECTION, SOLUTION EPIDURAL; INFILTRATION; INTRACAUDAL; PERINEURAL PRN
Status: DISCONTINUED | OUTPATIENT
Start: 2020-11-13 | End: 2020-11-13 | Stop reason: SDUPTHER

## 2020-11-13 RX ORDER — SODIUM CHLORIDE 9 MG/ML
INJECTION, SOLUTION INTRAVENOUS CONTINUOUS PRN
Status: DISCONTINUED | OUTPATIENT
Start: 2020-11-13 | End: 2020-11-13 | Stop reason: SDUPTHER

## 2020-11-13 RX ADMIN — PROPOFOL 80 MG: 10 INJECTION, EMULSION INTRAVENOUS at 14:23

## 2020-11-13 RX ADMIN — LIDOCAINE HYDROCHLORIDE 40 MG: 20 INJECTION, SOLUTION EPIDURAL; INFILTRATION; INTRACAUDAL; PERINEURAL at 14:23

## 2020-11-13 RX ADMIN — PROPOFOL 20 MG: 10 INJECTION, EMULSION INTRAVENOUS at 14:38

## 2020-11-13 RX ADMIN — SODIUM CHLORIDE: 9 INJECTION, SOLUTION INTRAVENOUS at 13:51

## 2020-11-13 RX ADMIN — PROPOFOL 40 MG: 10 INJECTION, EMULSION INTRAVENOUS at 14:25

## 2020-11-13 ASSESSMENT — PAIN - FUNCTIONAL ASSESSMENT: PAIN_FUNCTIONAL_ASSESSMENT: 0-10

## 2020-11-13 NOTE — OP NOTE
Colonoscopy Op Note    DATE OF PROCEDURE: 11/13/2020    SURGEON: Oneil Hamman, MD    PREOPERATIVE DIAGNOSIS: Diagnostic colonoscopy for Loose stools, IBS    POSTOPERATIVE DIAGNOSIS: Same, colon polyp, otherwise negative    OPERATION: Procedure(s):  COLONOSCOPY DIAGNOSTIC with forcep polypectomy and random biopsies    ANESTHESIA: Local monitored anesthesia. ESTIMATED BLOOD LOSS: nil     COMPLICATIONS: None. SPECIMENS:   ID Type Source Tests Collected by Time Destination   A : bx cecal polyp Tissue Colon SURGICAL PATHOLOGY Oneil Hamman, MD 11/13/2020 1429    B : bx terminal ileum Tissue Tissue SURGICAL PATHOLOGY Oneil Hamman, MD 11/13/2020 1431    C : random colon bx Tissue Colon SURGICAL PATHOLOGY Oneil Hamman, MD 11/13/2020 1433        HISTORY: The patient is a 41y.o. year old male with history of above preop diagnosis. I recommended colonoscopy with possible biopsy or polypectomy and I explained the risk, benefits, expected outcome, and alternatives to the procedure. Risks included but are not limited to bleeding, infection, respiratory distress, hypotension, and perforation of the colon. The patient understands and is in agreement. PROCEDURE: The patient was given IV conscious sedation per anesthesia. The patient was given supplemental oxygen by nasal cannula. The colonoscope was inserted per rectum and advanced under direct vision to the cecum without difficulty, identified by appendiceal orifice and ileocecal valve. The prep was good so exam was adequate. FINDINGS:    MARIBEL: normal    Terminal Ileum: normal    Cecum/Ascending colon: There is a 2 to 3 mm polyp status post forcep polypectomy    Transverse colon: normal    Descending/Sigmoid colon: normal    Rectum/Anus: examined in normal and retroflexed positions and was normal    Random biopsies were taken throughout due to symptoms. The colon was decompressed and the scope was removed.   The withdraw time was approximately 12 minutes. The patient tolerated the procedure well. ASSESSMENT/PLAN:   1. Follow-up biopsies  2. Continue diet modification  3. Colorectal Cancer Screening - recommend repeat colonoscopy in 5 years (may change pending biopsy results). Sooner if issues/concerns.     Mariluz Gmóez MD  11/13/20  2:42 PM

## 2020-11-13 NOTE — ANESTHESIA PRE PROCEDURE
Alcohol use: Yes     Comment: socially                                Counseling given: Not Answered      Vital Signs (Current):   Vitals:    11/12/20 1222 11/13/20 1346   BP:  126/88   Pulse:  86   Resp:  16   Temp:  97.6 °F (36.4 °C)   TempSrc:  Temporal   SpO2:  98%   Weight: 197 lb (89.4 kg) 197 lb (89.4 kg)   Height: 5' 11\" (1.803 m) 5' 11\" (1.803 m)                                              BP Readings from Last 3 Encounters:   11/13/20 126/88   10/15/20 (!) 140/80   10/01/20 110/70       NPO Status: Time of last liquid consumption: 2355                        Time of last solid consumption: 1900                        Date of last liquid consumption: 11/12/20                        Date of last solid food consumption: 11/11/20    BMI:   Wt Readings from Last 3 Encounters:   11/13/20 197 lb (89.4 kg)   10/15/20 203 lb 8 oz (92.3 kg)   10/01/20 196 lb (88.9 kg)     Body mass index is 27.48 kg/m². CBC:   Lab Results   Component Value Date    WBC 5.2 09/17/2020    RBC 5.27 09/17/2020    HGB 16.2 09/17/2020    HCT 48.1 09/17/2020    MCV 91.3 09/17/2020    RDW 12.5 09/17/2020     09/17/2020       CMP:   Lab Results   Component Value Date     09/17/2020    K 4.4 09/17/2020     09/17/2020    CO2 24 09/17/2020    BUN 20 09/17/2020    CREATININE 1.0 09/17/2020    GFRAA >60 09/17/2020    LABGLOM >60 09/17/2020    GLUCOSE 89 09/17/2020    PROT 7.3 09/17/2020    CALCIUM 10.0 09/17/2020    BILITOT 1.9 09/17/2020    ALKPHOS 61 09/17/2020    AST 24 09/17/2020    ALT 28 09/17/2020       POC Tests: No results for input(s): POCGLU, POCNA, POCK, POCCL, POCBUN, POCHEMO, POCHCT in the last 72 hours.     Coags: No results found for: PROTIME, INR, APTT    HCG (If Applicable): No results found for: PREGTESTUR, PREGSERUM, HCG, HCGQUANT     ABGs: No results found for: PHART, PO2ART, SCI7VLY, WZE8QDI, BEART, R7ZXJKVA     Type & Screen (If Applicable):  No results found for: LABABO, LABRH    Drug/Infectious Status (If Applicable):  No results found for: HIV, HEPCAB    COVID-19 Screening (If Applicable): No results found for: COVID19      Anesthesia Evaluation  Patient summary reviewed no history of anesthetic complications:   Airway: Mallampati: II  TM distance: >3 FB     Mouth opening: > = 3 FB Dental:          Pulmonary:Negative Pulmonary ROS breath sounds clear to auscultation                             Cardiovascular:Negative CV ROS  Exercise tolerance: good (>4 METS),           Rhythm: regular  Rate: normal                    Neuro/Psych:   Negative Neuro/Psych ROS              GI/Hepatic/Renal:   (+) GERD:,          ROS comment: H/o IBS. Endo/Other: Negative Endo/Other ROS                    Abdominal:           Vascular: negative vascular ROS. Anesthesia Plan      MAC     ASA 2     (Backup GA if needed)  Induction: intravenous. Anesthetic plan and risks discussed with patient. Plan discussed with CRNA.                 Nadege Layton MD   11/13/2020

## 2020-11-13 NOTE — PROGRESS NOTES
Abdomen soft with + bowel sounds.
Discharge and anesthesia instructions given to patient and spouse, denies any questions
Have you been tested for COVID  Yes           Have you been told you were positive for COVID No  Have you had any known exposure to someone that is positive for COVID No  Do you have a cough                   No              Do you have shortness of breath No                 Do you have a sore throat            No                Are you having chills                    No                Are you having muscle aches. No                    Please come to the hospital wearing a mask and have your significant other wear a mask as well. Both of you should check your temperature before leaving to come here,  if it is 100 or higher please call [phone] for instruction.
survey after surgery we would greatly appreciate your comments    [] Parent/guardian of a minor must accompany their child and remain on the premises  the entire time they are under our care     [] Pediatric patients may bring favorite toy, blanket or comfort item with them    [] A caregiver or family member must remain with the patient during their stay if they are mentally handicapped, have dementia, disoriented or unable to use a call light or would be a safety concern if left unattended    [x] Please notify surgeon if you develop any illness between now and time of surgery (cold, cough, sore throat, fever, nausea, vomiting) or any signs of infections  including skin, wounds, and dental.    []  The Outpatient Pharmacy is available to fill your prescription here on your day of surgery, ask your preop nurse for details    [] Other instructions    EDUCATIONAL MATERIALS PROVIDED:    [] PAT Preoperative Education Packet/Booklet     [] Medication List    [] Transfusion bracelet applied with instructions    [] Shower with soap, lather and rinse well, and use CHG wipes provided the evening before surgery as instructed    [] Incentive spirometer with instructions

## 2020-11-13 NOTE — H&P
111 [facility] Surgery Clinic Note     Assessment/Plan:        Diagnosis Orders   1. Irritable bowel syndrome with diarrhea  dicyclomine (BENTYL) 20 MG tablet        Bentyl. Continue Pepcid. Diet modification with low FODMAP. Plan for colonoscopy. Has had recent EGD            Return for Colonoscopy.          Chief Complaint   Patient presents with    New Patient       ref by Dr. Biib Nam for IBS and diarrhea. GERD. has never had a colonoscopy before. has had an EGD before.          PCP: Prosper Greco DO     HPI: Natasha Mari is a 41 y.o. male who presents in consultation for GERD and irritable bowel symptoms. His GERD is worse at night. He does take Pepcid nightly. He was on AcipHex but that made his stools much looser and he was changed off without the Pepcid. There is no nausea or vomiting. He denies any tobacco use. He occasionally drinks alcohol does not cause exacerbation of his symptoms. He has a cup or 2 of caffeine in the morning. He also endorses loose stools for years. He is never had prior colonoscopy. He did have an EGD last year Dr. Beltran Daniel which showed a small hiatal hernia and gastritis. H. pylori was negative. He denies any melena or hematochezia. He does have cramping and bloating most on the left side. There is no change in his symptoms with bowel movement either improvement or worsening. No change with BMs he does not do much fiber in his diet. His wife is a cardiac rehab exercise physiologist      Past Medical History        Past Medical History:   Diagnosis Date    Acid reflux disease             Past Surgical History         Past Surgical History:   Procedure Laterality Date    SHOULDER SURGERY Right 2018     bone spurs    UPPER GASTROINTESTINAL ENDOSCOPY N/A 3/1/2019     EGD BIOPSY performed by Deanne Lawrence MD at [address] [place] [address] Medications           Prior to Admission medications    Medication Sig Start Date End Date Taking?  Authorizing Provider   dicyclomine (BENTYL) 20 MG tablet Take 1 tablet by mouth 3 times daily as needed (cramping) 10/15/20   Yes Bel Donato MD   famotidine (PEPCID) 40 MG tablet Take 1 tablet by mouth every evening 9/17/20   Yes Aliza Zhong DO   RABEprazole (ACIPHEX) 20 MG tablet TAKE ONE TABLET BY MOUTH EVERY DAY 2/4/19     Historical Provider, MD           No Known Allergies     Social History   Social History            Socioeconomic History    Marital status:        Spouse name: None    Number of children: None    Years of education: None    Highest education level: None   Occupational History    None   Social Needs    Financial resource strain: None    Food insecurity       Worry: None       Inability: None    Transportation needs       Medical: None       Non-medical: None   Tobacco Use    Smoking status: Never Smoker    Smokeless tobacco: Never Used   Substance and Sexual Activity    Alcohol use: Yes       Comment: socially    Drug use: No    Sexual activity: None   Lifestyle    Physical activity       Days per week: None       Minutes per session: None    Stress: None   Relationships    Social connections       Talks on phone: None       Gets together: None       Attends Moravian service: None       Active member of club or organization: None       Attends meetings of clubs or organizations: None       Relationship status: None    Intimate partner violence       Fear of current or ex partner: None       Emotionally abused: None       Physically abused: None       Forced sexual activity: None   Other Topics Concern    None   Social History Narrative    None           Family History   History reviewed.  No pertinent family history.        Review of Systems   All other systems reviewed and are negative.                  Objective:  Vitals       Vitals:     10/15/20 1545   BP: (!) 140/80   Site: Right Upper Arm   Position: Sitting   Cuff Size: Medium Adult   Pulse: 73   Resp: 16   Temp: 98.5 °F (36.9 °C)   TempSrc: Temporal   SpO2: 99%   Weight: 203 lb 8 oz (92.3 kg)   Height: 5' 11\" (1.803 m)           Physical Exam  Constitutional:       General: He is not in acute distress. Appearance: He is not diaphoretic. HENT:      Head: Normocephalic and atraumatic. Eyes:      General:         Right eye: No discharge. Left eye: No discharge. Neck:      Trachea: No tracheal deviation. Cardiovascular:      Rate and Rhythm: Normal rate. Pulmonary:      Effort: Pulmonary effort is normal. No respiratory distress. Abdominal:      General: There is no distension. Palpations: Abdomen is soft. Tenderness: There is no abdominal tenderness. There is no guarding or rebound. Skin:     General: Skin is warm and dry. Neurological:      Mental Status: He is alert and oriented to person, place, and time.                      Lizz Daigle MD       NOTE: This report, in part or full,may have been transcribed using voice recognition software. Every effort was made to ensure accuracy; however, inadvertent computerized transcription errors may be present. Please excuse any transcriptional grammatical or spelling errors that may have escaped my editorial review.     CC:  Francois Dsouza [id number], DO

## 2020-11-15 NOTE — ANESTHESIA POSTPROCEDURE EVALUATION
Department of Anesthesiology  Postprocedure Note    Patient: Alysha Barajas  MRN: [id number]  YOB: 1975  Date of evaluation: 11/14/2020  Time:  11:14 PM     Procedure Summary     Date:  11/13/20 Room / Location:  [facility] 02 / [facility]    Anesthesia Start:  4088 Anesthesia Stop:  1798    Procedure:  COLONOSCOPY WITH BIOPSY (N/A ) Diagnosis:  (IRRITABLE BOWEL SYNDROME)    Surgeon:  Gely Self MD Responsible Provider:  Sandoval Platt MD    Anesthesia Type:  MAC ASA Status:  2          Anesthesia Type: MAC    Quintin Phase I: Quintin Score: 10    Quintin Phase II: Quintin Score: 10    Last vitals: Reviewed and per EMR flowsheets.        Anesthesia Post Evaluation    Patient location during evaluation: PACU  Patient participation: complete - patient participated  Level of consciousness: awake  Airway patency: patent  Nausea & Vomiting: no vomiting and no nausea  Complications: no  Cardiovascular status: hemodynamically stable  Respiratory status: acceptable  Hydration status: stable

## 2020-12-01 ENCOUNTER — OFFICE VISIT (OUTPATIENT)
Dept: SURGERY | Age: 45
End: 2020-12-01
Payer: COMMERCIAL

## 2020-12-01 VITALS
HEART RATE: 65 BPM | OXYGEN SATURATION: 99 % | BODY MASS INDEX: 28.9 KG/M2 | HEIGHT: 71 IN | SYSTOLIC BLOOD PRESSURE: 139 MMHG | DIASTOLIC BLOOD PRESSURE: 99 MMHG | TEMPERATURE: 98.2 F | WEIGHT: 206.4 LBS | RESPIRATION RATE: 16 BRPM

## 2020-12-01 PROCEDURE — 99213 OFFICE O/P EST LOW 20 MIN: CPT | Performed by: SURGERY

## 2020-12-01 RX ORDER — PANTOPRAZOLE SODIUM 40 MG/1
40 TABLET, DELAYED RELEASE ORAL
Qty: 90 TABLET | Refills: 1 | Status: SHIPPED
Start: 2020-12-01 | End: 2021-12-09

## 2020-12-01 NOTE — PROGRESS NOTES
111 [facility] Surgery Clinic Note    Assessment/Plan:     Diagnosis Orders   1. Gastroesophageal reflux disease without esophagitis  pantoprazole (PROTONIX) 40 MG tablet    Change Pepcid to PPI. 2. Irritable bowel syndrome with diarrhea      Recommend fiber supplementation and diet modification. Return in about 3 months (around 3/1/2021). Chief Complaint   Patient presents with    2 Week Follow-Up     follow up on colonoscopy. has bad days and good days. PCP: Em Crespo DO    HPI: Yassine Amaral is a 41 y.o. male here for follow-up of colonoscopy for-year-old bowel type symptoms. He had tubular adenoma removed. Random biopsies were negative for any microscopic changes. He does still have some intermittent loose stools. He is has not done any fiber supplementation. He does have some intermittent GERD symptoms. Takes Pepcid for this. He treats alcohol only occasionally. He does not smoke. Is 1 2 cups of coffee in the mornings. He has worsening of his symptoms with sauces. Review of Systems   All other systems reviewed and are negative. The remainder of the past medical, past surgical, family, and psychosocial history, as well as medication and allergy review, were completed and are as documented elsewhere in the chart. Objective:  Vitals:    12/01/20 1556   BP: (!) 139/99   Site: Left Upper Arm   Position: Sitting   Cuff Size: Medium Adult   Pulse: 65   Resp: 16   Temp: 98.2 °F (36.8 °C)   TempSrc: Temporal   SpO2: 99%   Weight: 206 lb 6.4 oz (93.6 kg)   Height: 5' 11\" (1.803 m)          Physical Exam  Constitutional:       General: He is not in acute distress. Appearance: He is not diaphoretic. Cardiovascular:      Rate and Rhythm: Normal rate. Pulmonary:      Effort: Pulmonary effort is normal. No respiratory distress. Abdominal:      General: There is no distension. Palpations: Abdomen is soft. Tenderness:  There is no abdominal tenderness. There is no guarding or rebound. Karrie Willett MD  12/5/2020    NOTE: This report, in part or full, may have been transcribed using voice recognition software. Every effort was made to ensure accuracy; however, inadvertent computerized transcription errors may be present. Please excuse any transcriptional grammatical or spelling errors that may have escaped my editorial review.       CC: Cordell Zhong, DO

## 2021-06-04 ENCOUNTER — TELEPHONE (OUTPATIENT)
Dept: SURGERY | Age: 46
End: 2021-06-04

## 2021-06-04 NOTE — TELEPHONE ENCOUNTER
MA got a call from a Liat Zepeda at [address] [place] [address],[address] Floor in [place] stating the patient needed a refill on his Protonix 40mg. MA refilled it for the patient with 1 refill and a 90 day supply which will give him another 6 month supply.     Electronically signed by Yvette Carrasco on 6/4/21 at 1:35 PM EDT

## 2021-08-29 LAB
CHOLESTEROL, TOTAL: 155 MG/DL (ref 0–199)
GLUCOSE BLD-MCNC: 97 MG/DL (ref 74–107)
HDLC SERPL-MCNC: 53 MG/DL
LDL CHOLESTEROL CALCULATED: 89 MG/DL (ref 0–99)
TRIGL SERPL-MCNC: 67 MG/DL (ref 0–149)

## 2021-12-01 ENCOUNTER — TELEPHONE (OUTPATIENT)
Dept: SURGERY | Age: 46
End: 2021-12-01

## 2021-12-01 NOTE — TELEPHONE ENCOUNTER
Pantoprazole 40mg #14 1 po qd to [facility], patient to see Dr Armond Harper on 12-09-21.   Electronically signed by Esperanza Banegas MA on 12/1/2021 at 11:10 AM

## 2021-12-09 ENCOUNTER — OFFICE VISIT (OUTPATIENT)
Dept: SURGERY | Age: 46
End: 2021-12-09
Payer: COMMERCIAL

## 2021-12-09 VITALS
WEIGHT: 213 LBS | DIASTOLIC BLOOD PRESSURE: 76 MMHG | BODY MASS INDEX: 29.82 KG/M2 | SYSTOLIC BLOOD PRESSURE: 124 MMHG | RESPIRATION RATE: 18 BRPM | HEART RATE: 82 BPM | HEIGHT: 71 IN | TEMPERATURE: 97.7 F | OXYGEN SATURATION: 98 %

## 2021-12-09 DIAGNOSIS — K21.9 GASTROESOPHAGEAL REFLUX DISEASE WITHOUT ESOPHAGITIS: Primary | ICD-10-CM

## 2021-12-09 DIAGNOSIS — K58.0 IRRITABLE BOWEL SYNDROME WITH DIARRHEA: ICD-10-CM

## 2021-12-09 PROCEDURE — 99213 OFFICE O/P EST LOW 20 MIN: CPT | Performed by: SURGERY

## 2021-12-09 RX ORDER — PANTOPRAZOLE SODIUM 20 MG/1
20 TABLET, DELAYED RELEASE ORAL DAILY
Qty: 30 TABLET | Refills: 5 | Status: SHIPPED
Start: 2021-12-09 | End: 2022-07-18

## 2021-12-09 NOTE — PROGRESS NOTES
[facility] Surgery Clinic Note    Assessment/Plan:     Diagnosis Orders   1. Gastroesophageal reflux disease without esophagitis  pantoprazole (PROTONIX) 20 MG tablet    Doing well with PPI. Continue along with dietary modification   2. Irritable bowel syndrome with diarrhea      Continue fiber and dietary modification         Chief Complaint   Patient presents with    Other     medicine check up        PCP: Arabella Cotto DO    HPI: Melissa Roque is a 40 y.o. male here for follow-up with GERD. He does complain of some occasional cramps with food and with bowel movements. And fiber Gummies are minimally. He is not having any melena or hematochezia. He has no nausea or vomiting. His GERD is much better with the Protonix on food. It controls his reflux much better than when he was on Pepcid. He has no dysphagia. No specific foods cause his symptoms he states      Review of Systems   All other systems reviewed and are negative. The remainder of the past medical, past surgical, family, and psychosocial history, as well as medication and allergy review, were completed and are as documented elsewhere in the chart. Objective:  Vitals:    12/09/21 1613   BP: 124/76   Pulse: 82   Resp: 18   Temp: 97.7 °F (36.5 °C)   TempSrc: Temporal   SpO2: 98%   Weight: 213 lb (96.6 kg)   Height: 5' 11\" (1.803 m)          Physical Exam  Constitutional:       General: He is not in acute distress. Appearance: He is not diaphoretic. Cardiovascular:      Rate and Rhythm: Normal rate. Pulmonary:      Effort: Pulmonary effort is normal. No respiratory distress. Abdominal:      General: There is no distension. Palpations: Abdomen is soft. Tenderness: There is no abdominal tenderness. There is no guarding or rebound. Pedro Millard MD      NOTE: This report, in part or full, may have been transcribed using voice recognition software.  Every effort was made to ensure accuracy; however, inadvertent computerized transcription errors may be present. Please excuse any transcriptional grammatical or spelling errors that may have escaped my editorial review.       CC: Soledad Zhong, DO

## 2022-02-07 ENCOUNTER — TELEPHONE (OUTPATIENT)
Dept: PRIMARY CARE CLINIC | Age: 47
End: 2022-02-07

## 2022-02-07 NOTE — TELEPHONE ENCOUNTER
MA called in Pantoprazole 20mg # 60 1 po bid to [facility] in [place] per Dr Denis Session.   Electronically signed by Evie Ramires MA on 2/7/2022 at 2:43 PM

## 2022-02-07 NOTE — TELEPHONE ENCOUNTER
Wife calling for Dr. iRco Aw office. States that all numbers she has are not working. Tried to transfer to [phone] and the phone seems to be out of service, tried to call that number 3 separate times. I did let the wife know that I would send a message to the clinical staff.

## 2022-02-07 NOTE — TELEPHONE ENCOUNTER
Patient called and stated that Dr Geraldo Adrian cut his Pantoprazole to 20 mg from 40mg and patient feels he needs to increase back to 40 due to increased symptoms. MA sent message to Dr Geraldo Adrian for his advise.    Electronically signed by Meg Lee MA on 2/7/2022 at 12:16 PM

## 2022-02-18 ENCOUNTER — TELEPHONE (OUTPATIENT)
Dept: SURGERY | Age: 47
End: 2022-02-18

## 2022-02-18 NOTE — TELEPHONE ENCOUNTER
MA phoned in Protonix to [facility] in [place] [facility] ([phone]). Patient wife states that the insurance company denied the 20mg 2x a day. Ma phoned in :  protonix 40mg  Dispense 30  1x a day   With 2 refills.     Electronically signed by Batool Coello on 2/18/2022 at 12:24 PM

## 2022-04-25 LAB
CHOLESTEROL, TOTAL: 203 MG/DL (ref 0–199)
GLUCOSE BLD-MCNC: 98 MG/DL (ref 74–107)
HDLC SERPL-MCNC: 55 MG/DL
LDL CHOLESTEROL CALCULATED: 133 MG/DL (ref 0–99)
TRIGL SERPL-MCNC: 75 MG/DL (ref 0–149)

## 2022-07-18 RX ORDER — PANTOPRAZOLE SODIUM 40 MG/1
TABLET, DELAYED RELEASE ORAL
Qty: 30 TABLET | Refills: 0 | Status: SHIPPED | OUTPATIENT
Start: 2022-07-18

## 2022-07-25 ENCOUNTER — TELEPHONE (OUTPATIENT)
Dept: SURGERY | Age: 47
End: 2022-07-25

## 2022-07-25 NOTE — TELEPHONE ENCOUNTER
Patients wife called in requesting refills on  Protonix that was just refilled on 7/18/22. Patients wife is unsure why no refills were given.  Last office visit was 2/7/22

## 2023-03-21 ENCOUNTER — TELEPHONE (OUTPATIENT)
Dept: SURGERY | Age: 48
End: 2023-03-21

## 2023-03-21 NOTE — TELEPHONE ENCOUNTER
Received a voice message from the patient's wife [phone] who is wanting to request a refill for Protonix. His last visit was 12/19/2021. Forwarded message to Karyna Cagle MA.   Electronically signed by Shanelle Mercado on 3/21/2023 at 4:12 PM

## 2023-03-22 RX ORDER — PANTOPRAZOLE SODIUM 40 MG/1
TABLET, DELAYED RELEASE ORAL
Qty: 30 TABLET | Refills: 0 | Status: SHIPPED | OUTPATIENT
Start: 2023-03-22

## 2023-04-18 ENCOUNTER — OFFICE VISIT (OUTPATIENT)
Dept: SURGERY | Age: 48
End: 2023-04-18

## 2023-04-18 VITALS
HEART RATE: 74 BPM | DIASTOLIC BLOOD PRESSURE: 99 MMHG | OXYGEN SATURATION: 98 % | BODY MASS INDEX: 31.07 KG/M2 | WEIGHT: 217 LBS | HEIGHT: 70 IN | SYSTOLIC BLOOD PRESSURE: 145 MMHG | TEMPERATURE: 97.5 F

## 2023-04-18 DIAGNOSIS — K21.9 GASTROESOPHAGEAL REFLUX DISEASE WITHOUT ESOPHAGITIS: Primary | ICD-10-CM

## 2023-04-18 DIAGNOSIS — K58.0 IRRITABLE BOWEL SYNDROME WITH DIARRHEA: ICD-10-CM

## 2023-04-18 RX ORDER — PANTOPRAZOLE SODIUM 40 MG/1
40 TABLET, DELAYED RELEASE ORAL DAILY
Qty: 90 TABLET | Refills: 4 | Status: SHIPPED | OUTPATIENT
Start: 2023-04-18

## 2023-04-18 NOTE — PROGRESS NOTES
[address] [facility] Surgery Clinic Note    Assessment/Plan:     Diagnosis Orders   1. Gastroesophageal reflux disease without esophagitis  pantoprazole (PROTONIX) 40 MG tablet    PPI. Dietary modification. 2. Irritable bowel syndrome with diarrhea      Much better with dietary modification, continue. Return if symptoms worsen or fail to improve. Chief Complaint   Patient presents with    Other     Medication refill       PCP: Silvino Reese DO    HPI: Freddy Murry is a 53 y.o. male here for follow-up of GERD. His last EGD was in 2019 with Dr. Radha Barrios. It showed a small hiatal hernia. He denies any dysphagia. He has no melena. His IBS type symptoms have also been controlled much better with the diet. Bowels are moving okay. He has no abdominal pain. He had not needed taking Bentyl. Review of Systems   All other systems reviewed and are negative. Past Medical History:   Diagnosis Date    Acid reflux disease     IBS (irritable bowel syndrome)     with diarrhea       Past Surgical History:   Procedure Laterality Date    COLONOSCOPY N/A 11/13/2020    COLONOSCOPY WITH BIOPSY performed by Eveline Sewell MD at [address] [facility] Right 2018    bone spurs    UPPER GASTROINTESTINAL ENDOSCOPY N/A 3/1/2019    EGD BIOPSY performed by Marjan Maradiaga MD at [address]       No family history on file.     Social History     Socioeconomic History    Marital status:      Spouse name: Not on file    Number of children: Not on file    Years of education: Not on file    Highest education level: Not on file   Occupational History    Not on file   Tobacco Use    Smoking status: Never    Smokeless tobacco: Never   Vaping Use    Vaping Use: Never used   Substance and Sexual Activity    Alcohol use: Yes     Comment: socially    Drug use: Never    Sexual activity: Not on file   Other Topics Concern    Not on file   Social History Narrative    Not on file     Social

## 2023-12-26 ENCOUNTER — NURSE TRIAGE (OUTPATIENT)
Dept: OTHER | Facility: CLINIC | Age: 48
End: 2023-12-26

## 2023-12-26 NOTE — TELEPHONE ENCOUNTER
Location of patient: [place]    Subjective: Caller states \"Sick for about a month\"   Halima Rodriguez has been sick for several weeks and has not yet gone to see a provider. Now on vacation in [place] and symptoms have worsened. Current Symptoms: SPO2 93-96  HR 90's at rest  Easily winded  Fever 101.8 for 5-6 days  Cough    Onset:  Has had a cough for over a month and recently a fever for >5 days    Pain Severity: Denies pain when taking a breath in    Temperature: 101.8 today    What has been tried: Tylenol and Ibuprofen, Mucinex    Recommended disposition: Go to ED/[facility] Now (Or to Office with PCP Approval)    Care advice provided, patient verbalizes understanding; denies any other questions or concerns; instructed to call back for any new or worsening symptoms. Patient/caller agrees to proceed to nearest [facility] or emergency room    Attention Provider: Thank you for allowing me to participate in the care of your patient. The patient was connected to triage in response to symptoms provided. Please do not respond through this encounter as the response is not directed to a shared pool.     Reason for Disposition   Patient sounds very sick or weak to the triager    Protocols used: Breathing Difficulty-ADULT-[place]

## 2024-07-08 DIAGNOSIS — K21.9 GASTROESOPHAGEAL REFLUX DISEASE WITHOUT ESOPHAGITIS: ICD-10-CM

## 2024-07-08 NOTE — TELEPHONE ENCOUNTER
Per Dr Sandra, ok to call in 1 refill for pantoprazole.  Called felicitas Gupta  with refill.,  Patient to call in to schedule appt for additional refills.

## 2024-07-09 RX ORDER — PANTOPRAZOLE SODIUM 40 MG/1
40 TABLET, DELAYED RELEASE ORAL DAILY
Qty: 90 TABLET | Refills: 0 | OUTPATIENT
Start: 2024-07-09

## 2024-08-12 ENCOUNTER — TELEPHONE (OUTPATIENT)
Dept: SURGERY | Age: 49
End: 2024-08-12

## 2024-08-12 NOTE — TELEPHONE ENCOUNTER
Called Pantaprazole 40mg to employee pharmacy in [place].  QTY 30.  No refills.  They could not tell me if it would be ready by 2.  Left vm with patient.

## 2024-08-16 ENCOUNTER — OFFICE VISIT (OUTPATIENT)
Dept: SURGERY | Age: 49
End: 2024-08-16
Payer: COMMERCIAL

## 2024-08-16 VITALS
HEART RATE: 68 BPM | TEMPERATURE: 97.1 F | HEIGHT: 71 IN | WEIGHT: 217 LBS | DIASTOLIC BLOOD PRESSURE: 106 MMHG | OXYGEN SATURATION: 95 % | BODY MASS INDEX: 30.38 KG/M2 | SYSTOLIC BLOOD PRESSURE: 149 MMHG

## 2024-08-16 DIAGNOSIS — K58.0 IRRITABLE BOWEL SYNDROME WITH DIARRHEA: ICD-10-CM

## 2024-08-16 DIAGNOSIS — K21.9 GASTROESOPHAGEAL REFLUX DISEASE WITHOUT ESOPHAGITIS: Primary | ICD-10-CM

## 2024-08-16 DIAGNOSIS — R10.11 RIGHT UPPER QUADRANT PAIN: ICD-10-CM

## 2024-08-16 PROCEDURE — 99214 OFFICE O/P EST MOD 30 MIN: CPT | Performed by: SURGERY

## 2024-08-16 RX ORDER — FAMOTIDINE 40 MG/1
40 TABLET, FILM COATED ORAL EVERY EVENING
Qty: 30 TABLET | Refills: 3 | Status: SHIPPED | OUTPATIENT
Start: 2024-08-16

## 2024-08-16 NOTE — PROGRESS NOTES
General: He is not in acute distress.     Appearance: He is obese. He is not diaphoretic.   HENT:      Head: Normocephalic and atraumatic.   Eyes:      General:         Right eye: No discharge.         Left eye: No discharge.   Neck:      Trachea: No tracheal deviation.   Cardiovascular:      Rate and Rhythm: Normal rate.   Pulmonary:      Effort: Pulmonary effort is normal. No respiratory distress.   Abdominal:      General: There is no distension.      Palpations: Abdomen is soft.      Tenderness: There is no guarding or rebound.   Skin:     General: Skin is warm and dry.   Neurological:      Mental Status: He is alert and oriented to person, place, and time.         CBC:   Lab Results   Component Value Date/Time    WBC 5.2 09/17/2020 07:45 AM    RBC 5.27 09/17/2020 07:45 AM    HGB 16.2 09/17/2020 07:45 AM    HCT 48.1 09/17/2020 07:45 AM    MCV 91.3 09/17/2020 07:45 AM    MCH 30.7 09/17/2020 07:45 AM    MCHC 33.7 09/17/2020 07:45 AM    RDW 12.5 09/17/2020 07:45 AM     09/17/2020 07:45 AM    MPV 10.0 09/17/2020 07:45 AM     CMP:    Lab Results   Component Value Date/Time     09/17/2020 07:45 AM    K 4.4 09/17/2020 07:45 AM     09/17/2020 07:45 AM    CO2 24 09/17/2020 07:45 AM    BUN 20 09/17/2020 07:45 AM    CREATININE 1.0 09/17/2020 07:45 AM    GFRAA >60 09/17/2020 07:45 AM    LABGLOM >60 09/17/2020 07:45 AM    GLUCOSE 98 04/25/2022 05:57 AM    CALCIUM 10.0 09/17/2020 07:45 AM    BILITOT 1.9 09/17/2020 07:45 AM    ALKPHOS 61 09/17/2020 07:45 AM    AST 24 09/17/2020 07:45 AM    ALT 28 09/17/2020 07:45 AM     PT/INR:  No results found for: \"PROTIME\", \"INR\"  HgBA1c:  No results found for: \"LABA1C\"  LIPASE:    Lab Results   Component Value Date/Time    LIPASE 19 09/17/2020 07:45 AM          Abiel Sandra MD      I have examined the patient and performed the key aspects of the physical exam,and reviewed the record (including laboratory findings, results, and all pertinent radiology images, which are

## 2024-09-25 LAB
CHOLEST SERPL-MCNC: 205 MG/DL
GLUCOSE SERPL-MCNC: 105 MG/DL (ref 74–99)
HDLC SERPL-MCNC: 55 MG/DL
LDLC SERPL CALC-MCNC: 135 MG/DL
PATIENT FASTING?: YES
TRIGL SERPL-MCNC: 77 MG/DL
VLDLC SERPL CALC-MCNC: 15 MG/DL

## 2024-12-16 DIAGNOSIS — K21.9 GASTROESOPHAGEAL REFLUX DISEASE WITHOUT ESOPHAGITIS: ICD-10-CM

## 2024-12-16 RX ORDER — FAMOTIDINE 40 MG/1
40 TABLET, FILM COATED ORAL EVERY EVENING
Qty: 30 TABLET | Refills: 5 | Status: SHIPPED | OUTPATIENT
Start: 2024-12-16

## 2025-06-16 LAB
CHOLEST SERPL-MCNC: 214 MG/DL
GLUCOSE SERPL-MCNC: 95 MG/DL (ref 74–99)
HDLC SERPL-MCNC: 67 MG/DL
LDLC SERPL CALC-MCNC: 133 MG/DL
PATIENT FASTING?: YES
TRIGL SERPL-MCNC: 69 MG/DL
VLDLC SERPL CALC-MCNC: 14 MG/DL

## 2025-07-16 ENCOUNTER — OFFICE VISIT (OUTPATIENT)
Dept: SURGERY | Age: 50
End: 2025-07-16
Payer: COMMERCIAL

## 2025-07-16 VITALS
WEIGHT: 213 LBS | OXYGEN SATURATION: 96 % | HEIGHT: 71 IN | BODY MASS INDEX: 29.82 KG/M2 | RESPIRATION RATE: 18 BRPM | DIASTOLIC BLOOD PRESSURE: 76 MMHG | HEART RATE: 63 BPM | SYSTOLIC BLOOD PRESSURE: 121 MMHG

## 2025-07-16 DIAGNOSIS — K44.9 HIATAL HERNIA: Primary | ICD-10-CM

## 2025-07-16 DIAGNOSIS — K21.9 GASTROESOPHAGEAL REFLUX DISEASE WITHOUT ESOPHAGITIS: ICD-10-CM

## 2025-07-16 DIAGNOSIS — K58.0 IRRITABLE BOWEL SYNDROME WITH DIARRHEA: ICD-10-CM

## 2025-07-16 PROCEDURE — 99214 OFFICE O/P EST MOD 30 MIN: CPT | Performed by: SURGERY

## 2025-07-16 RX ORDER — FAMOTIDINE 40 MG/1
40 TABLET, FILM COATED ORAL EVERY EVENING
Qty: 30 TABLET | Refills: 11 | Status: SHIPPED | OUTPATIENT
Start: 2025-07-16

## 2025-07-19 NOTE — PROGRESS NOTES
[facility] Surgery Clinic Note    Assessment & Plan  1. Acid reflux.  Intermittent reflux occurs, particularly when lying down. Currently taking Pepcid 40 mg every night but experiences variable relief. Discussed potential risks of long-term PPI use, including kidney problems, dementia, and osteoporosis, with a very low chance of occurrence. Prefers to continue with Pepcid due to fewer side effects. A Bravo pH probe will be inserted during the endoscopy to assess the severity of reflux. Advised to avoid foods that trigger reflux, such as caffeine, spicy foods, and sauces. Weight loss recommended to help manage reflux symptoms. Prescription for Pepcid 40 mg.    2. Hiatal hernia.  Known small hiatal hernia. Discussed the possibility of surgical repair, including procedure details and potential benefits. Expressed interest in considering surgery based on the results of the upcoming endoscopy and Bravo pH probe.    3. Loose stools.  Loose stools have improved. No blood in the stool or abnormal weight loss noted. Experiences intermittent upper abdominal pain. A colonoscopy will be scheduled to further investigate gastrointestinal health.    Return for Endoscopy.    John was seen today for colonoscopy.    Diagnoses and all orders for this visit:    Hiatal hernia    Gastroesophageal reflux disease without esophagitis  -     famotidine (PEPCID) 40 MG tablet; Take 1 tablet by mouth every evening    Irritable bowel syndrome with diarrhea          Chief Complaint   Patient presents with    Colonoscopy     5 yr colonoscopy recall 11/13/20       PCP: Bladimir Zhong DO  CC: No ref. provider found     John Quezada is a 50 y.o. male .    History of Present Illness  The patient presents for evaluation of acid reflux and loose stools.    He reports an improvement in his previously loose stools, with no presence of blood. Intermittent upper abdominal pain is noted, typically occurring in the higher region of the

## 2025-07-29 ENCOUNTER — TELEPHONE (OUTPATIENT)
Dept: SURGERY | Age: 50
End: 2025-07-29

## 2025-07-29 PROBLEM — K44.9 HIATAL HERNIA: Status: ACTIVE | Noted: 2025-07-29

## 2025-07-29 NOTE — TELEPHONE ENCOUNTER
John Quezada is scheduled for EGD with bravo/colonoscopy with Dr Sandra on 10-30-25 at [facility]. Patient needs to be NPO after midnight the night before procedure. All surgery instructions were explained to the patient and a surgery letter was also mailed out. MA informed patient that PAT will also be calling to review pre-op instructions and medications. Patient verbalized understanding.  Electronically signed by Katarina Iyer MA on 7/29/2025 at 5:37 AM

## 2025-09-02 ENCOUNTER — TELEPHONE (OUTPATIENT)
Dept: SURGERY | Age: 50
End: 2025-09-02

## (undated) DEVICE — FORCEPS BX OVL CUP FEN DISPOSABLE CAP L 160CM RAD JAW 4

## (undated) DEVICE — BLOCK BITE 60FR RUBBER ADLT DENTAL

## (undated) DEVICE — GRADUATE TRIANG MEASURE 1000ML BLK PRNT

## (undated) DEVICE — SPONGE GZ W4XL4IN RAYON POLY FILL CVR W/ NONWOVEN FAB

## (undated) DEVICE — FORCEPS BX L240CM JAW DIA2.4MM ORNG L CAP W/ NDL DISP RAD